# Patient Record
Sex: FEMALE | Race: WHITE | Employment: UNEMPLOYED | URBAN - METROPOLITAN AREA
[De-identification: names, ages, dates, MRNs, and addresses within clinical notes are randomized per-mention and may not be internally consistent; named-entity substitution may affect disease eponyms.]

---

## 2017-03-14 ENCOUNTER — OFFICE VISIT (OUTPATIENT)
Dept: ENDOCRINOLOGY | Age: 57
End: 2017-03-14

## 2017-03-14 VITALS
OXYGEN SATURATION: 98 % | HEART RATE: 76 BPM | BODY MASS INDEX: 29.98 KG/M2 | WEIGHT: 191 LBS | DIASTOLIC BLOOD PRESSURE: 68 MMHG | RESPIRATION RATE: 14 BRPM | SYSTOLIC BLOOD PRESSURE: 105 MMHG | HEIGHT: 67 IN

## 2017-03-14 LAB — HBA1C MFR BLD: 10.6 %

## 2017-03-14 PROCEDURE — 99214 OFFICE O/P EST MOD 30 MIN: CPT | Performed by: INTERNAL MEDICINE

## 2017-03-14 PROCEDURE — 83036 HEMOGLOBIN GLYCOSYLATED A1C: CPT | Performed by: INTERNAL MEDICINE

## 2017-03-14 RX ORDER — ATORVASTATIN CALCIUM 40 MG/1
40 TABLET, FILM COATED ORAL NIGHTLY
COMMUNITY

## 2017-03-14 RX ORDER — ANASTROZOLE 1 MG/1
1 TABLET ORAL DAILY
COMMUNITY
End: 2020-09-28 | Stop reason: ALTCHOICE

## 2017-06-15 ENCOUNTER — OFFICE VISIT (OUTPATIENT)
Dept: ENDOCRINOLOGY | Age: 57
End: 2017-06-15

## 2017-06-15 VITALS
HEART RATE: 85 BPM | RESPIRATION RATE: 16 BRPM | HEIGHT: 67 IN | SYSTOLIC BLOOD PRESSURE: 88 MMHG | DIASTOLIC BLOOD PRESSURE: 67 MMHG | WEIGHT: 187.2 LBS | OXYGEN SATURATION: 95 % | BODY MASS INDEX: 29.38 KG/M2

## 2017-06-15 LAB — HBA1C MFR BLD: 6.7 %

## 2017-06-15 PROCEDURE — 83036 HEMOGLOBIN GLYCOSYLATED A1C: CPT | Performed by: INTERNAL MEDICINE

## 2017-06-15 PROCEDURE — 99214 OFFICE O/P EST MOD 30 MIN: CPT | Performed by: INTERNAL MEDICINE

## 2017-11-13 RX ORDER — DULAGLUTIDE 0.75 MG/.5ML
INJECTION, SOLUTION SUBCUTANEOUS
Qty: 2 ML | Refills: 3 | Status: SHIPPED | OUTPATIENT
Start: 2017-11-13 | End: 2018-03-20 | Stop reason: SDUPTHER

## 2018-01-11 ENCOUNTER — OFFICE VISIT (OUTPATIENT)
Dept: ENDOCRINOLOGY | Age: 58
End: 2018-01-11

## 2018-01-11 VITALS
WEIGHT: 177.8 LBS | DIASTOLIC BLOOD PRESSURE: 68 MMHG | HEIGHT: 67 IN | SYSTOLIC BLOOD PRESSURE: 102 MMHG | RESPIRATION RATE: 14 BRPM | HEART RATE: 78 BPM | OXYGEN SATURATION: 100 % | BODY MASS INDEX: 27.91 KG/M2

## 2018-01-11 PROCEDURE — G8427 DOCREV CUR MEDS BY ELIG CLIN: HCPCS | Performed by: INTERNAL MEDICINE

## 2018-01-11 PROCEDURE — 99214 OFFICE O/P EST MOD 30 MIN: CPT | Performed by: INTERNAL MEDICINE

## 2018-01-11 PROCEDURE — G8419 CALC BMI OUT NRM PARAM NOF/U: HCPCS | Performed by: INTERNAL MEDICINE

## 2018-01-11 PROCEDURE — G8484 FLU IMMUNIZE NO ADMIN: HCPCS | Performed by: INTERNAL MEDICINE

## 2018-01-11 PROCEDURE — 1036F TOBACCO NON-USER: CPT | Performed by: INTERNAL MEDICINE

## 2018-01-11 PROCEDURE — 3014F SCREEN MAMMO DOC REV: CPT | Performed by: INTERNAL MEDICINE

## 2018-01-11 PROCEDURE — 3017F COLORECTAL CA SCREEN DOC REV: CPT | Performed by: INTERNAL MEDICINE

## 2018-01-11 PROCEDURE — 3046F HEMOGLOBIN A1C LEVEL >9.0%: CPT | Performed by: INTERNAL MEDICINE

## 2018-01-11 NOTE — PROGRESS NOTES
Endocrinology   Nick Segovia M.D. Phone: [phone]   FAX: [phone]       Merry Winchester   YOB: 1960    Date of Visit:  1/11/2018    No Known Allergies  Outpatient Prescriptions Marked as Taking for the 1/11/18 encounter (Office Visit) with Ken Crowell MD   Medication Sig Dispense Refill    TRULICITY 2.14 KG/5.0UP SOPN INJECT 0.75 MG( 0.5 ML) UNDER THE SKIN ONCE A WEEK 2 mL 3    insulin glargine (LANTUS SOLOSTAR) 100 UNIT/ML injection pen Inject 35 Units into the skin nightly 15 mL 5    metFORMIN (GLUCOPHAGE) 1000 MG tablet Take 1 tablet by mouth 2 times daily (with meals) 60 tablet 3    Insulin Pen Needle 32G X 4 MM MISC 1 each by Does not apply route 4 times daily (before meals and nightly) 150 each 2    atorvastatin (LIPITOR) 40 MG tablet Take 40 mg by mouth nightly      anastrozole (ARIMIDEX) 1 MG tablet Take 1 mg by mouth daily      levothyroxine (SYNTHROID) 50 MCG tablet Take 50 mcg by mouth Daily      lisinopril (PRINIVIL;ZESTRIL) 5 MG tablet Take 5 mg by mouth daily      metoprolol (TOPROL-XL) 50 MG XL tablet Take 50 mg by mouth 2 times daily            Vitals:    01/11/18 1620   BP: 102/68   Site: Right Arm   Position: Sitting   Cuff Size: Medium Adult   Pulse: 78   Resp: 14   SpO2: 100%   Weight: 177 lb 12.8 oz (80.6 kg)   Height: 5' 7\" (1.702 m)     Body mass index is 27.85 kg/m².      Wt Readings from Last 3 Encounters:   01/11/18 177 lb 12.8 oz (80.6 kg)   06/15/17 187 lb 3.2 oz (84.9 kg)   03/14/17 191 lb (86.6 kg)     BP Readings from Last 3 Encounters:   01/11/18 102/68   06/15/17 88/67   03/14/17 105/68        Past Medical History:   Diagnosis Date    CAD (coronary artery disease) 2009    Cancer ([address] 3/15    Depression     Hyperlipidemia     Hypothyroidism 2012    Type II or unspecified type diabetes mellitus without mention of complication, not stated as uncontrolled 1990    Urinary incontinence      Past Surgical History:   Procedure Laterality Date    APPENDECTOMY      CHOLECYSTECTOMY      COLONOSCOPY      CORONARY ARTERY BYPASS GRAFT  2009     Family History   Problem Relation Age of Onset    Arthritis Mother     Diabetes Father     Heart Disease Paternal Uncle     Early Death Paternal Uncle     Kidney Disease Paternal Uncle     Arthritis Maternal Grandmother     Stroke Maternal Grandfather      History   Smoking Status    Former Smoker    Quit date: 1/1/2009   Smokeless Tobacco    Never Used      History   Alcohol Use No       HPI      Susanne Painter is a 62 y.o. female who is here for a follow-up for management of DM. Patient has a PMH of Type 2 DM, hypertension, hyperlipidemia, hypothyroidism, breast cancer, CAD, memory problems. Diagnosed with Diabetes Mellitus type 2 at the age of 27 yrs. Course has been variable . Microvascular complications:No retinopathy (Last eye exam: 2017), No nephropathy . No  Peripheral neuropathy    Home regimen:  Currently on metformin 1000 mg BID  Lantus insulin 35 units qhs  Trulicity 9.28 mg weekly. Previous medications: Novolog  Blood glucose trend  Not testing sugars. Diet: Eats 3 meals/day. Had Nutrition education:  Exercise: using treadmill for 45-60 mins. Review of Systems   Constitutional: Positive for malaise/fatigue. Negative for fever, chills, weight loss and diaphoresis. Eyes: Negative for blurred vision, double vision and photophobia. Respiratory: Negative for cough and hemoptysis. Cardiovascular: Negative for chest pain, palpitations and orthopnea. Genitourinary: Negative for dysuria, urgency, frequency, hematuria and flank pain. Musculoskeletal: Negative for myalgias, back pain, joint pain, falls and neck pain. Skin: Negative for itching and rash. Neurological: Negative for dizziness, tingling, tremors, sensory change, speech change, focal weakness, seizures, loss of consciousness and headaches.    Endo/Heme/Allergies: Negative for environmental allergies and polydipsia. Does not bruise/bleed easily. Psychiatric/Behavioral: Positive for memory loss. Negative for depression, suicidal ideas, hallucinations and substance abuse. The patient is not nervous/anxious and does not have insomnia. Physical Exam   Constitutional: She is oriented to person, place, and time. She appears well-developed and well-nourished. HENT:   Head: Normocephalic. Mouth/Throat: Oropharynx is clear and moist.   Eyes: EOM are normal. Right eye exhibits no discharge. Neck: No thyromegaly present. Cardiovascular: Normal rate and normal heart sounds. Pulmonary/Chest: Effort normal and breath sounds normal.   Abdominal: Soft. She exhibits no distension. There is no tenderness. Musculoskeletal: She exhibits no tenderness. Neurological: She is alert and oriented to person, place, and time. Skin: Skin is warm. No rash noted. She is not diaphoretic. Psychiatric: Her behavior is normal.     Normal sensation to 10 grams monofilament testing. Normal pulses in both feet. No ulcer. Labs  Reviewed from [facility].    Assessment/Plan       1. Type 2 DM     Riki Scott is a 62 y.o. female has Type 2 DM     Control has improved. A1c 8.5 % ---> 7.7 %---> 6.6 % ---> 10.6 %---> 6.7 %-->  6.3 %      She has memory issues and was having difficulty remembering to take her shots.     -Decrease Lantus to 32 units QHS   Trulicity 9.01 mg weekly. ( mother reminds her)  Continue metformin   Advised to start testing sugars. Advised follow-up with the ophthalmologist once year. Last urine microalbumin/cr ratio was normal in 03/15. Discussed foot care. Pt on ASA. 2. HTN/HLP as per PCP. 3. Hypothyroidism. On levothyroxine. TSH normal in 08/17    4. Breast ca. S/p surgery and radiation. As per oncology.

## 2018-03-21 RX ORDER — DULAGLUTIDE 0.75 MG/.5ML
INJECTION, SOLUTION SUBCUTANEOUS
Qty: 4 PEN | Refills: 3 | Status: SHIPPED | OUTPATIENT
Start: 2018-03-21 | End: 2018-08-02 | Stop reason: SDUPTHER

## 2018-05-17 ENCOUNTER — OFFICE VISIT (OUTPATIENT)
Dept: ENDOCRINOLOGY | Age: 58
End: 2018-05-17

## 2018-05-17 VITALS
HEART RATE: 69 BPM | OXYGEN SATURATION: 100 % | HEIGHT: 67 IN | BODY MASS INDEX: 27.75 KG/M2 | DIASTOLIC BLOOD PRESSURE: 70 MMHG | RESPIRATION RATE: 14 BRPM | SYSTOLIC BLOOD PRESSURE: 110 MMHG | WEIGHT: 176.8 LBS

## 2018-05-17 LAB — HBA1C MFR BLD: 6.3 %

## 2018-05-17 PROCEDURE — G8417 CALC BMI ABV UP PARAM F/U: HCPCS | Performed by: INTERNAL MEDICINE

## 2018-05-17 PROCEDURE — 2022F DILAT RTA XM EVC RTNOPTHY: CPT | Performed by: INTERNAL MEDICINE

## 2018-05-17 PROCEDURE — 3044F HG A1C LEVEL LT 7.0%: CPT | Performed by: INTERNAL MEDICINE

## 2018-05-17 PROCEDURE — 99214 OFFICE O/P EST MOD 30 MIN: CPT | Performed by: INTERNAL MEDICINE

## 2018-05-17 PROCEDURE — 1036F TOBACCO NON-USER: CPT | Performed by: INTERNAL MEDICINE

## 2018-05-17 PROCEDURE — 83036 HEMOGLOBIN GLYCOSYLATED A1C: CPT | Performed by: INTERNAL MEDICINE

## 2018-05-17 PROCEDURE — G8427 DOCREV CUR MEDS BY ELIG CLIN: HCPCS | Performed by: INTERNAL MEDICINE

## 2018-05-17 PROCEDURE — 3017F COLORECTAL CA SCREEN DOC REV: CPT | Performed by: INTERNAL MEDICINE

## 2018-08-03 RX ORDER — DULAGLUTIDE 0.75 MG/.5ML
INJECTION, SOLUTION SUBCUTANEOUS
Qty: 2 ML | Refills: 3 | Status: SHIPPED | OUTPATIENT
Start: 2018-08-03 | End: 2018-08-21 | Stop reason: SDUPTHER

## 2018-08-13 RX ORDER — INSULIN GLARGINE 100 [IU]/ML
30 INJECTION, SOLUTION SUBCUTANEOUS NIGHTLY
Qty: 15 ML | Refills: 2 | Status: SHIPPED | OUTPATIENT
Start: 2018-08-13 | End: 2019-02-13 | Stop reason: SDUPTHER

## 2018-08-13 RX ORDER — INSULIN GLARGINE 100 [IU]/ML
30 INJECTION, SOLUTION SUBCUTANEOUS NIGHTLY
COMMUNITY
End: 2018-08-13 | Stop reason: SDUPTHER

## 2018-11-12 ENCOUNTER — OFFICE VISIT (OUTPATIENT)
Dept: ENDOCRINOLOGY | Age: 58
End: 2018-11-12
Payer: MEDICARE

## 2018-11-12 VITALS
SYSTOLIC BLOOD PRESSURE: 94 MMHG | HEIGHT: 67 IN | HEART RATE: 72 BPM | OXYGEN SATURATION: 100 % | BODY MASS INDEX: 26.97 KG/M2 | WEIGHT: 171.8 LBS | RESPIRATION RATE: 14 BRPM | DIASTOLIC BLOOD PRESSURE: 51 MMHG

## 2018-11-12 DIAGNOSIS — E03.9 ACQUIRED HYPOTHYROIDISM: ICD-10-CM

## 2018-11-12 DIAGNOSIS — E78.00 PURE HYPERCHOLESTEROLEMIA: ICD-10-CM

## 2018-11-12 LAB — HBA1C MFR BLD: 6.1 %

## 2018-11-12 PROCEDURE — G8484 FLU IMMUNIZE NO ADMIN: HCPCS | Performed by: INTERNAL MEDICINE

## 2018-11-12 PROCEDURE — 99214 OFFICE O/P EST MOD 30 MIN: CPT | Performed by: INTERNAL MEDICINE

## 2018-11-12 PROCEDURE — 2022F DILAT RTA XM EVC RTNOPTHY: CPT | Performed by: INTERNAL MEDICINE

## 2018-11-12 PROCEDURE — 1036F TOBACCO NON-USER: CPT | Performed by: INTERNAL MEDICINE

## 2018-11-12 PROCEDURE — G8417 CALC BMI ABV UP PARAM F/U: HCPCS | Performed by: INTERNAL MEDICINE

## 2018-11-12 PROCEDURE — G8427 DOCREV CUR MEDS BY ELIG CLIN: HCPCS | Performed by: INTERNAL MEDICINE

## 2018-11-12 PROCEDURE — 83036 HEMOGLOBIN GLYCOSYLATED A1C: CPT | Performed by: INTERNAL MEDICINE

## 2018-11-12 PROCEDURE — 3017F COLORECTAL CA SCREEN DOC REV: CPT | Performed by: INTERNAL MEDICINE

## 2018-11-12 PROCEDURE — 3044F HG A1C LEVEL LT 7.0%: CPT | Performed by: INTERNAL MEDICINE

## 2018-11-24 DIAGNOSIS — E03.9 ACQUIRED HYPOTHYROIDISM: ICD-10-CM

## 2018-11-24 LAB
CREATININE URINE: 225.1 MG/DL (ref 28–259)
MICROALBUMIN UR-MCNC: 1.3 MG/DL
MICROALBUMIN/CREAT UR-RTO: 5.8 MG/G (ref 0–30)
TSH REFLEX FT4: 2.68 UIU/ML (ref 0.27–4.2)

## 2019-02-13 RX ORDER — INSULIN GLARGINE 100 [IU]/ML
INJECTION, SOLUTION SUBCUTANEOUS
Qty: 15 ML | Refills: 1 | Status: SHIPPED | OUTPATIENT
Start: 2019-02-13 | End: 2019-07-03 | Stop reason: SDUPTHER

## 2019-03-12 ENCOUNTER — OFFICE VISIT (OUTPATIENT)
Dept: ENDOCRINOLOGY | Age: 59
End: 2019-03-12
Payer: MEDICARE

## 2019-03-12 VITALS
SYSTOLIC BLOOD PRESSURE: 130 MMHG | RESPIRATION RATE: 16 BRPM | OXYGEN SATURATION: 97 % | HEIGHT: 67 IN | DIASTOLIC BLOOD PRESSURE: 80 MMHG | WEIGHT: 171.8 LBS | BODY MASS INDEX: 26.97 KG/M2 | HEART RATE: 86 BPM

## 2019-03-12 DIAGNOSIS — E03.9 ACQUIRED HYPOTHYROIDISM: ICD-10-CM

## 2019-03-12 DIAGNOSIS — E78.00 PURE HYPERCHOLESTEROLEMIA: ICD-10-CM

## 2019-03-12 LAB — HBA1C MFR BLD: 6.2 %

## 2019-03-12 PROCEDURE — 99214 OFFICE O/P EST MOD 30 MIN: CPT | Performed by: INTERNAL MEDICINE

## 2019-03-12 PROCEDURE — G8417 CALC BMI ABV UP PARAM F/U: HCPCS | Performed by: INTERNAL MEDICINE

## 2019-03-12 PROCEDURE — 83036 HEMOGLOBIN GLYCOSYLATED A1C: CPT | Performed by: INTERNAL MEDICINE

## 2019-03-12 PROCEDURE — 1036F TOBACCO NON-USER: CPT | Performed by: INTERNAL MEDICINE

## 2019-03-12 PROCEDURE — G8484 FLU IMMUNIZE NO ADMIN: HCPCS | Performed by: INTERNAL MEDICINE

## 2019-03-12 PROCEDURE — G8427 DOCREV CUR MEDS BY ELIG CLIN: HCPCS | Performed by: INTERNAL MEDICINE

## 2019-03-12 PROCEDURE — 2022F DILAT RTA XM EVC RTNOPTHY: CPT | Performed by: INTERNAL MEDICINE

## 2019-03-12 PROCEDURE — 3017F COLORECTAL CA SCREEN DOC REV: CPT | Performed by: INTERNAL MEDICINE

## 2019-03-12 PROCEDURE — 3044F HG A1C LEVEL LT 7.0%: CPT | Performed by: INTERNAL MEDICINE

## 2019-03-12 RX ORDER — LANCETS 28 GAUGE
1 EACH MISCELLANEOUS DAILY
Qty: 100 EACH | Refills: 3 | Status: SHIPPED | OUTPATIENT
Start: 2019-03-12

## 2019-03-12 RX ORDER — BLOOD-GLUCOSE METER
1 KIT MISCELLANEOUS DAILY PRN
Qty: 1 DEVICE | Refills: 0 | Status: SHIPPED | OUTPATIENT
Start: 2019-03-12

## 2019-05-24 RX ORDER — PEN NEEDLE, DIABETIC 32GX 5/32"
NEEDLE, DISPOSABLE MISCELLANEOUS
Qty: 120 EACH | Refills: 5 | Status: SHIPPED | OUTPATIENT
Start: 2019-05-24 | End: 2020-08-17

## 2019-07-03 RX ORDER — INSULIN GLARGINE 100 [IU]/ML
INJECTION, SOLUTION SUBCUTANEOUS
Qty: 15 ML | Refills: 1 | Status: SHIPPED | OUTPATIENT
Start: 2019-07-03 | End: 2019-11-14 | Stop reason: SDUPTHER

## 2019-07-16 ENCOUNTER — OFFICE VISIT (OUTPATIENT)
Dept: ENDOCRINOLOGY | Age: 59
End: 2019-07-16
Payer: MEDICARE

## 2019-07-16 VITALS
BODY MASS INDEX: 26.3 KG/M2 | HEART RATE: 80 BPM | SYSTOLIC BLOOD PRESSURE: 97 MMHG | WEIGHT: 167.6 LBS | OXYGEN SATURATION: 97 % | HEIGHT: 67 IN | DIASTOLIC BLOOD PRESSURE: 67 MMHG

## 2019-07-16 DIAGNOSIS — E78.00 PURE HYPERCHOLESTEROLEMIA: ICD-10-CM

## 2019-07-16 DIAGNOSIS — E03.9 ACQUIRED HYPOTHYROIDISM: ICD-10-CM

## 2019-07-16 LAB — HBA1C MFR BLD: 6.8 %

## 2019-07-16 PROCEDURE — 99214 OFFICE O/P EST MOD 30 MIN: CPT | Performed by: INTERNAL MEDICINE

## 2019-07-16 PROCEDURE — 3044F HG A1C LEVEL LT 7.0%: CPT | Performed by: INTERNAL MEDICINE

## 2019-07-16 PROCEDURE — 3017F COLORECTAL CA SCREEN DOC REV: CPT | Performed by: INTERNAL MEDICINE

## 2019-07-16 PROCEDURE — G8417 CALC BMI ABV UP PARAM F/U: HCPCS | Performed by: INTERNAL MEDICINE

## 2019-07-16 PROCEDURE — 1036F TOBACCO NON-USER: CPT | Performed by: INTERNAL MEDICINE

## 2019-07-16 PROCEDURE — G8427 DOCREV CUR MEDS BY ELIG CLIN: HCPCS | Performed by: INTERNAL MEDICINE

## 2019-07-16 PROCEDURE — 2022F DILAT RTA XM EVC RTNOPTHY: CPT | Performed by: INTERNAL MEDICINE

## 2019-07-16 PROCEDURE — 83036 HEMOGLOBIN GLYCOSYLATED A1C: CPT | Performed by: INTERNAL MEDICINE

## 2019-07-16 NOTE — PROGRESS NOTES
disease) 2009    Cancer [facility]) 3/15    Depression     Hyperlipidemia     Hypothyroidism 2012    Type II or unspecified type diabetes mellitus without mention of complication, not stated as uncontrolled 1990    Urinary incontinence      Past Surgical History:   Procedure Laterality Date    APPENDECTOMY      CHOLECYSTECTOMY      COLONOSCOPY      CORONARY ARTERY BYPASS GRAFT  2009     Family History   Problem Relation Age of Onset    Arthritis Mother     Diabetes Father     Heart Disease Paternal Uncle     Early Death Paternal Uncle     Kidney Disease Paternal Uncle     Arthritis Maternal Grandmother     Stroke Maternal Grandfather      Social History     Tobacco Use   Smoking Status Former Smoker    Last attempt to quit: 1/1/2009    Years since quitting: 10.5   Smokeless Tobacco Never Used      Social History     Substance and Sexual Activity   Alcohol Use No       HPI      Silvia Blake is a 62 y.o. female who is here for a follow-up for management of DM. Patient has a PMH of Type 2 DM, hypertension, hyperlipidemia, hypothyroidism, breast cancer, CAD, memory problems. Diagnosed with Diabetes Mellitus type 2 at the age of 27 yrs. Course has been variable . Microvascular complications:No retinopathy (Last eye exam: 2017), No nephropathy . No  Peripheral neuropathy    Home regimen:  Currently on metformin 1000 mg BID  Basaglar insulin 26 units qhs  Trulicity 7.12 mg weekly. Previous medications: Novolog  Blood glucose trend  BS       Diet: Eats 3 meals/day. Had Nutrition education:. She has hypothyroidism  Takes levothyroxine 50 mcg daily. Takes it in the morning. She has hyperlipidemia. She takes atorvastatin 40 mg daily. Tolerating well. Review of Systems   Constitutional: Positive for malaise/fatigue. Negative for fever, chills, weight loss and diaphoresis. Eyes: Negative for blurred vision, double vision and photophobia.    Respiratory: Negative for cough

## 2019-11-14 RX ORDER — INSULIN GLARGINE 100 [IU]/ML
INJECTION, SOLUTION SUBCUTANEOUS
Qty: 15 ML | Refills: 2 | Status: SHIPPED | OUTPATIENT
Start: 2019-11-14 | End: 2020-05-05

## 2020-01-14 ENCOUNTER — OFFICE VISIT (OUTPATIENT)
Dept: ENDOCRINOLOGY | Age: 60
End: 2020-01-14
Payer: MEDICARE

## 2020-01-14 VITALS
BODY MASS INDEX: 25.11 KG/M2 | WEIGHT: 160 LBS | HEART RATE: 78 BPM | SYSTOLIC BLOOD PRESSURE: 120 MMHG | OXYGEN SATURATION: 98 % | DIASTOLIC BLOOD PRESSURE: 77 MMHG | HEIGHT: 67 IN

## 2020-01-14 LAB — HBA1C MFR BLD: 5.7 %

## 2020-01-14 PROCEDURE — 1036F TOBACCO NON-USER: CPT | Performed by: INTERNAL MEDICINE

## 2020-01-14 PROCEDURE — 83036 HEMOGLOBIN GLYCOSYLATED A1C: CPT | Performed by: INTERNAL MEDICINE

## 2020-01-14 PROCEDURE — 2022F DILAT RTA XM EVC RTNOPTHY: CPT | Performed by: INTERNAL MEDICINE

## 2020-01-14 PROCEDURE — 3044F HG A1C LEVEL LT 7.0%: CPT | Performed by: INTERNAL MEDICINE

## 2020-01-14 PROCEDURE — 99213 OFFICE O/P EST LOW 20 MIN: CPT | Performed by: INTERNAL MEDICINE

## 2020-01-14 PROCEDURE — G8417 CALC BMI ABV UP PARAM F/U: HCPCS | Performed by: INTERNAL MEDICINE

## 2020-01-14 PROCEDURE — G8427 DOCREV CUR MEDS BY ELIG CLIN: HCPCS | Performed by: INTERNAL MEDICINE

## 2020-01-14 PROCEDURE — 3017F COLORECTAL CA SCREEN DOC REV: CPT | Performed by: INTERNAL MEDICINE

## 2020-01-14 PROCEDURE — G8484 FLU IMMUNIZE NO ADMIN: HCPCS | Performed by: INTERNAL MEDICINE

## 2020-01-14 NOTE — PROGRESS NOTES
Genitourinary: Negative for dysuria, urgency, frequency, hematuria and flank pain. Musculoskeletal: Negative for myalgias, back pain, joint pain, falls and neck pain. Skin: Negative for itching and rash. Neurological: Negative for dizziness, tingling, tremors, sensory change, speech change, focal weakness, seizures, loss of consciousness and headaches. Endo/Heme/Allergies: Negative for environmental allergies and polydipsia. Does not bruise/bleed easily. Psychiatric/Behavioral: Positive for memory loss. Negative for depression, suicidal ideas, hallucinations and substance abuse. The patient is not nervous/anxious and does not have insomnia.       l.         Labs  Reviewed from [facility]     Assessment/Plan       1. Type 2 DM     Tanya Amato is a 61 y.o. female has Type 2 DM     Control has improved. A1c 8.5 % ---> 7.7 %---> 6.6 % ---> 10.6 %---> 6.7 %-->  6.3 %  ---> 6.1 % ---> 6.2 % ---> 6.8 % ---> 5.7 %     She has memory issues and was having difficulty remembering to take her shots. Mother helps with the shots now. - Decreased Basaglar to 20 units QHS   continue Trulicity 2.38 mg weekly. ( mother reminds her)  Continue metformin             Advised follow-up with the ophthalmologist once year. Last urine microalbumin/cr ratio was normal in 03/15 ,11/18, 01/20   Discussed foot care. Pt on ASA. 2. HTN BP  normal. Managed by PCP      3. Hypothyroidism. On levothyroxine 50 mcg daily. .   TSH normal in 08/17 and 11/18 ( 2.68)---> 3.92 ( 04/19)---> 5.07    Dose increased to 75 mcg daily by PCP    Managed by pcp    4. Hyperlipidemia. On statins. Labs in 11/18 ---> 01/20  HDL 53---> 42  ----> 74    As per PCP    5. Breast ca. S/p surgery and radiation. As per oncology. 6. Dementia/memory loss.  As per neurology  Was given 5 days of IV steroids in June 2019

## 2020-04-08 RX ORDER — DULAGLUTIDE 0.75 MG/.5ML
INJECTION, SOLUTION SUBCUTANEOUS
Qty: 6 ML | Refills: 2 | Status: SHIPPED | OUTPATIENT
Start: 2020-04-08 | End: 2020-12-16

## 2020-04-08 NOTE — TELEPHONE ENCOUNTER
Medication:   Requested Prescriptions     Pending Prescriptions Disp Refills    Dulaglutide (TRULICITY) 5.77 AF/6.3VX SOPN [Pharmacy Med Name: Rush Fuentes 4.46AP/0.4XR SDP 4X0.5ML] 6 mL 2     Sig: INJECT THE CONTENTS OF 1 SYRINGE EVERY WEEK         Last appt: 1/14/2020   Next appt: 7/21/2020    Last OARRS: No flowsheet data found.

## 2020-05-05 RX ORDER — INSULIN GLARGINE 100 [IU]/ML
INJECTION, SOLUTION SUBCUTANEOUS
Qty: 15 ML | Refills: 2 | Status: SHIPPED | OUTPATIENT
Start: 2020-05-05 | End: 2020-12-21

## 2020-08-17 RX ORDER — PEN NEEDLE, DIABETIC 32GX 5/32"
NEEDLE, DISPOSABLE MISCELLANEOUS
Qty: 200 EACH | Refills: 2 | Status: SHIPPED | OUTPATIENT
Start: 2020-08-17

## 2020-09-28 ENCOUNTER — OFFICE VISIT (OUTPATIENT)
Dept: ENDOCRINOLOGY | Age: 60
End: 2020-09-28
Payer: MEDICARE

## 2020-09-28 VITALS
HEART RATE: 78 BPM | OXYGEN SATURATION: 98 % | BODY MASS INDEX: 25.06 KG/M2 | SYSTOLIC BLOOD PRESSURE: 99 MMHG | HEIGHT: 67 IN | DIASTOLIC BLOOD PRESSURE: 68 MMHG

## 2020-09-28 LAB — HBA1C MFR BLD: 6.9 %

## 2020-09-28 PROCEDURE — G8427 DOCREV CUR MEDS BY ELIG CLIN: HCPCS | Performed by: INTERNAL MEDICINE

## 2020-09-28 PROCEDURE — 3017F COLORECTAL CA SCREEN DOC REV: CPT | Performed by: INTERNAL MEDICINE

## 2020-09-28 PROCEDURE — 1036F TOBACCO NON-USER: CPT | Performed by: INTERNAL MEDICINE

## 2020-09-28 PROCEDURE — 2022F DILAT RTA XM EVC RTNOPTHY: CPT | Performed by: INTERNAL MEDICINE

## 2020-09-28 PROCEDURE — 3044F HG A1C LEVEL LT 7.0%: CPT | Performed by: INTERNAL MEDICINE

## 2020-09-28 PROCEDURE — 99213 OFFICE O/P EST LOW 20 MIN: CPT | Performed by: INTERNAL MEDICINE

## 2020-09-28 PROCEDURE — G8417 CALC BMI ABV UP PARAM F/U: HCPCS | Performed by: INTERNAL MEDICINE

## 2020-09-28 PROCEDURE — 83036 HEMOGLOBIN GLYCOSYLATED A1C: CPT | Performed by: INTERNAL MEDICINE

## 2020-09-28 RX ORDER — DULOXETIN HYDROCHLORIDE 60 MG/1
CAPSULE, DELAYED RELEASE ORAL
COMMUNITY
Start: 2020-07-18

## 2020-09-28 RX ORDER — LISINOPRIL 5 MG/1
5 TABLET ORAL DAILY
COMMUNITY
Start: 2020-04-29

## 2020-09-28 RX ORDER — FESOTERODINE FUMARATE 4 MG/1
TABLET, FILM COATED, EXTENDED RELEASE ORAL
COMMUNITY
Start: 2020-08-03

## 2020-09-28 RX ORDER — LEVOTHYROXINE SODIUM 0.07 MG/1
TABLET ORAL
COMMUNITY
Start: 2020-09-13 | End: 2021-06-01 | Stop reason: SDUPTHER

## 2020-09-28 NOTE — PROGRESS NOTES
Endocrinology   Taisha Stauffer M.D. Phone: [phone]   FAX: [phone]       Yeimi Stinson   YOB: 1960    Date of Visit:  9/28/2020    No Known Allergies  Outpatient Medications Marked as Taking for the 9/28/20 encounter (Office Visit) with Paul Ordoñez MD   Medication Sig Dispense Refill    lisinopril (PRINIVIL;ZESTRIL) 5 MG tablet Take 5 mg by mouth daily      fesoterodine (TOVIAZ) 4 MG TB24 ER tablet TAKE ONE TABLET(4MG) BY MOUTH DAILY AS NEEDED FOR URINARY INCONTINENCE      DULoxetine (CYMBALTA) 60 MG extended release capsule TAKE 1 CAPSULE BY MOUTH DAILY      BD PEN NEEDLE WILLAM U/F 32G X 4 MM MISC USE FOUR TIMES DAILY BEFORE MEALS AND NIGHTLY 200 each 2    BASAGLAR KWIKPEN 100 UNIT/ML injection pen INJECT 30 UNITS UNDER THE SKIN AT NIGHT 15 mL 2    Dulaglutide (TRULICITY) 9.88 AA/2.3QW SOPN INJECT THE CONTENTS OF 1 SYRINGE EVERY WEEK 6 mL 2    metFORMIN (GLUCOPHAGE) 1000 MG tablet TAKE 1 TABLET BY MOUTH TWICE DAILY 180 tablet 3    Blood Glucose Monitoring Suppl (FREESTYLE LITE) BETTY 1 Device by Does not apply route daily as needed (test BS 1-2 times a day) 1 Device 0    FREESTYLE LANCETS MISC 1 each by Does not apply route daily 100 each 3    blood glucose test strips (FREESTYLE LITE) strip 1 each by In Vitro route daily As needed. 100 each 3    atorvastatin (LIPITOR) 40 MG tablet Take 40 mg by mouth nightly      metoprolol (TOPROL-XL) 50 MG XL tablet Take 25 mg by mouth 2 times daily            Vitals:    09/28/20 1110   BP: 99/68   Site: Right Upper Arm   Position: Sitting   Cuff Size: Medium Adult   Pulse: 78   SpO2: 98%   Height: 5' 7\" (1.702 m)     Body mass index is 25.06 kg/m².      Wt Readings from Last 3 Encounters:   01/14/20 160 lb (72.6 kg)   07/16/19 167 lb 9.6 oz (76 kg)   03/12/19 171 lb 12.8 oz (77.9 kg)     BP Readings from Last 3 Encounters:   09/28/20 99/68   01/14/20 120/77   07/16/19 97/67        Past Medical History:   Diagnosis Date    CAD (coronary artery disease) 2009    Cancer [facility]) 3/15    Depression     Hyperlipidemia     Hypothyroidism     Type II or unspecified type diabetes mellitus without mention of complication, not stated as uncontrolled     Urinary incontinence      Past Surgical History:   Procedure Laterality Date    APPENDECTOMY      CHOLECYSTECTOMY      COLONOSCOPY      CORONARY ARTERY BYPASS GRAFT       Family History   Problem Relation Age of Onset    Arthritis Mother     Diabetes Father     Heart Disease Paternal Uncle     Early Death Paternal Uncle     Kidney Disease Paternal Uncle     Arthritis Maternal Grandmother     Stroke Maternal Grandfather      Social History     Tobacco Use   Smoking Status Former Smoker    Last attempt to quit: 2009    Years since quittin.7   Smokeless Tobacco Never Used      Social History     Substance and Sexual Activity   Alcohol Use No       HPI      Luis Fernando Hernandez is a 61 y.o. female who is here for a follow-up for management of DM. Patient has a PMH of Type 2 DM, hypertension, hyperlipidemia, hypothyroidism, breast cancer, CAD, memory problems. Diagnosed with Diabetes Mellitus type 2 at the age of 27 yrs. Course has been variable . Microvascular complications:No retinopathy (Last eye exam: ), No nephropathy . No  Peripheral neuropathy    Home regimen:  Currently on metformin 1000 mg BID  Basaglar insulin 20 units qhs  Trulicity 6.95 mg weekly. Previous medications: Novolog  Blood glucose trend  Not testing regularly. BS       Diet: Eats 3 meals/day. Had Nutrition education:. She has hypothyroidism  Takes levothyroxine 75 mcg daily. Takes it in the morning. She has hyperlipidemia. She takes atorvastatin 40 mg daily. Tolerating well. Review of Systems   Constitutional: Positive for malaise/fatigue. Negative for fever, chills, weight loss and diaphoresis.    Eyes: Negative for blurred vision, double vision and

## 2020-12-16 RX ORDER — DULAGLUTIDE 0.75 MG/.5ML
INJECTION, SOLUTION SUBCUTANEOUS
Qty: 6 ML | Refills: 2 | Status: SHIPPED | OUTPATIENT
Start: 2020-12-16 | End: 2021-01-06 | Stop reason: SDUPTHER

## 2020-12-16 NOTE — TELEPHONE ENCOUNTER
LOV: 9/28/2020Verla Norton    NOV: 4/8/2021-bari     DOSE: 0.75 mg   Frequency: weekly     Pharmacy as Pended:     Per LOV Note: -continue Trulicity 6.36 mg weekly.     Per Last PHONE NOTE:     Lab Results   Component Value Date    LABA1C 6.9 09/28/2020

## 2020-12-21 RX ORDER — INSULIN GLARGINE 100 [IU]/ML
INJECTION, SOLUTION SUBCUTANEOUS
Qty: 15 ML | Refills: 2 | Status: SHIPPED | OUTPATIENT
Start: 2020-12-21 | End: 2021-05-24 | Stop reason: SDUPTHER

## 2020-12-21 NOTE — TELEPHONE ENCOUNTER
Medication:   Requested Prescriptions     Pending Prescriptions Disp Refills    BASAGLAR KWIKPEN 100 UNIT/ML injection pen [Pharmacy Med Name: Twin Diaz 100 U/ML KWIKPEN INJ 3ML] 15 mL 2     Sig: ADMINISTER 20 UNITS UNDER THE SKIN AT NIGHT         Last appt: 09/28/2020  Next appt: 04/08/2021    Last Labs DM:   Lab Results   Component Value Date    LABA1C 6.9 09/28/2020

## 2021-01-06 RX ORDER — DULAGLUTIDE 0.75 MG/.5ML
INJECTION, SOLUTION SUBCUTANEOUS
Qty: 6 PEN | Refills: 2 | Status: SHIPPED | OUTPATIENT
Start: 2021-01-06 | End: 2021-06-01 | Stop reason: SDUPTHER

## 2021-01-06 NOTE — TELEPHONE ENCOUNTER
Medication:   Requested Prescriptions     Pending Prescriptions Disp Refills    Dulaglutide (TRULICITY) 5.04 SE/7.4YC SOPN 6 pen 2     Sig: INJECT THE CONTENTS OF 1 SYRINGE INTO THE SKIN EVERY WEEK         Last appt: 09/28/2020  Next appt: 04/08/2021    Last Labs DM:   Lab Results   Component Value Date    LABA1C 6.9 09/28/2020

## 2021-03-18 ENCOUNTER — IMMUNIZATION (OUTPATIENT)
Dept: PRIMARY CARE CLINIC | Age: 61
End: 2021-03-18
Payer: MEDICARE

## 2021-03-18 PROCEDURE — 91301 COVID-19, MODERNA VACCINE 100MCG/0.5ML DOSE: CPT

## 2021-03-18 PROCEDURE — 0011A COVID-19, MODERNA VACCINE 100MCG/0.5ML DOSE: CPT

## 2021-04-15 ENCOUNTER — IMMUNIZATION (OUTPATIENT)
Dept: PRIMARY CARE CLINIC | Age: 61
End: 2021-04-15
Payer: MEDICARE

## 2021-04-15 PROCEDURE — 91301 COVID-19, MODERNA VACCINE 100MCG/0.5ML DOSE: CPT | Performed by: FAMILY MEDICINE

## 2021-04-15 PROCEDURE — 0012A COVID-19, MODERNA VACCINE 100MCG/0.5ML DOSE: CPT | Performed by: FAMILY MEDICINE

## 2021-05-24 ENCOUNTER — TELEPHONE (OUTPATIENT)
Dept: ENDOCRINOLOGY | Age: 61
End: 2021-05-24

## 2021-05-24 RX ORDER — INSULIN GLARGINE 100 [IU]/ML
INJECTION, SOLUTION SUBCUTANEOUS
Qty: 15 ML | Refills: 2 | Status: SHIPPED | OUTPATIENT
Start: 2021-05-24 | End: 2021-06-01 | Stop reason: DRUGHIGH

## 2021-06-01 ENCOUNTER — OFFICE VISIT (OUTPATIENT)
Dept: ENDOCRINOLOGY | Age: 61
End: 2021-06-01
Payer: MEDICARE

## 2021-06-01 VITALS
SYSTOLIC BLOOD PRESSURE: 110 MMHG | DIASTOLIC BLOOD PRESSURE: 80 MMHG | HEART RATE: 76 BPM | WEIGHT: 140 LBS | OXYGEN SATURATION: 98 % | BODY MASS INDEX: 21.97 KG/M2 | HEIGHT: 67 IN

## 2021-06-01 DIAGNOSIS — E11.9 TYPE 2 DIABETES MELLITUS WITHOUT COMPLICATION, UNSPECIFIED WHETHER LONG TERM INSULIN USE (HCC): ICD-10-CM

## 2021-06-01 DIAGNOSIS — E03.9 HYPOTHYROIDISM, UNSPECIFIED TYPE: Primary | ICD-10-CM

## 2021-06-01 DIAGNOSIS — E11.65 TYPE 2 DIABETES MELLITUS WITH HYPERGLYCEMIA, WITH LONG-TERM CURRENT USE OF INSULIN (HCC): ICD-10-CM

## 2021-06-01 DIAGNOSIS — Z79.4 TYPE 2 DIABETES MELLITUS WITH HYPERGLYCEMIA, WITH LONG-TERM CURRENT USE OF INSULIN (HCC): ICD-10-CM

## 2021-06-01 DIAGNOSIS — E55.9 VITAMIN D DEFICIENCY: ICD-10-CM

## 2021-06-01 DIAGNOSIS — E78.00 PURE HYPERCHOLESTEROLEMIA: ICD-10-CM

## 2021-06-01 LAB — HBA1C MFR BLD: 6.7 %

## 2021-06-01 PROCEDURE — 99214 OFFICE O/P EST MOD 30 MIN: CPT | Performed by: NURSE PRACTITIONER

## 2021-06-01 PROCEDURE — 1036F TOBACCO NON-USER: CPT | Performed by: NURSE PRACTITIONER

## 2021-06-01 PROCEDURE — G8427 DOCREV CUR MEDS BY ELIG CLIN: HCPCS | Performed by: NURSE PRACTITIONER

## 2021-06-01 PROCEDURE — 3044F HG A1C LEVEL LT 7.0%: CPT | Performed by: NURSE PRACTITIONER

## 2021-06-01 PROCEDURE — 83036 HEMOGLOBIN GLYCOSYLATED A1C: CPT | Performed by: NURSE PRACTITIONER

## 2021-06-01 PROCEDURE — 2022F DILAT RTA XM EVC RTNOPTHY: CPT | Performed by: NURSE PRACTITIONER

## 2021-06-01 PROCEDURE — G8420 CALC BMI NORM PARAMETERS: HCPCS | Performed by: NURSE PRACTITIONER

## 2021-06-01 PROCEDURE — 3017F COLORECTAL CA SCREEN DOC REV: CPT | Performed by: NURSE PRACTITIONER

## 2021-06-01 RX ORDER — LATANOPROST 50 UG/ML
SOLUTION/ DROPS OPHTHALMIC
COMMUNITY
Start: 2021-05-22

## 2021-06-01 RX ORDER — DOCUSATE SODIUM 50 MG AND SENNOSIDES 8.6 MG 8.6; 5 MG/1; MG/1
TABLET, FILM COATED ORAL
COMMUNITY
Start: 2021-04-16

## 2021-06-01 RX ORDER — DONEPEZIL HYDROCHLORIDE 10 MG/1
TABLET, FILM COATED ORAL
COMMUNITY
Start: 2021-05-03

## 2021-06-01 RX ORDER — INSULIN GLARGINE 100 [IU]/ML
INJECTION, SOLUTION SUBCUTANEOUS
Qty: 3 PEN | Refills: 2 | Status: SHIPPED | OUTPATIENT
Start: 2021-06-01

## 2021-06-01 RX ORDER — LEVOTHYROXINE SODIUM 0.07 MG/1
TABLET ORAL
Qty: 30 TABLET | Refills: 2 | Status: SHIPPED | OUTPATIENT
Start: 2021-06-01

## 2021-06-01 RX ORDER — DULAGLUTIDE 0.75 MG/.5ML
INJECTION, SOLUTION SUBCUTANEOUS
Qty: 6 PEN | Refills: 2 | Status: SHIPPED | OUTPATIENT
Start: 2021-06-01

## 2021-06-01 NOTE — PATIENT INSTRUCTIONS
To administer Trulicity every other week  May increase Basaglar up to 30 units at night  Follow up on labs    A1c to be done 3 months from today

## 2021-06-01 NOTE — PROGRESS NOTES
Endocrinology  Christy Kang, SERENA, [address] [facility], [address]  Phone [phone]  Fax [phone]    Tete Hernadez is a 61 y.o. female who is presenting for management of Diabetes Mellitus Type 2. PCP Elizabeth Boucher MD    Last A1C:   Lab Results   Component Value Date    LABA1C 6.7 2021    LABA1C 6.9 2020    LABA1C 5.7 2020     Last BP Readings:  BP Readings from Last 3 Encounters:   21 110/80   20 99/68   20 120/77     Last LDL: No results found for: LDLCALC, LDLCHOLESTEROL, LDLDIRECT  Aspirin Use: 81 mg     Tobacco/Alcohol History:  Social History     Tobacco Use   Smoking Status Former Smoker    Packs/day: 0.25    Years: 10.00    Pack years: 2.50    Types: Cigarettes    Quit date: 2009    Years since quittin.4   Smokeless Tobacco Never Used      Social History     Substance and Sexual Activity   Alcohol Use No       PMH includes  Past Medical History:   Diagnosis Date    CAD (coronary artery disease)     Cancer ([facility] [address] 3/15    Depression     Hyperlipidemia     Hypothyroidism     Type II or unspecified type diabetes mellitus without mention of complication, not stated as uncontrolled     Urinary incontinence      Family History   Problem Relation Age of Onset    Arthritis Mother     Diabetes Father     Heart Disease Paternal Uncle     Early Death Paternal Uncle     Kidney Disease Paternal Uncle     Arthritis Maternal Grandmother     Stroke Maternal Grandfather      Diabetes:  Pedro Shipman  was diagnosed with diabetes type 2 in : 27 years   On insulin: since    Patient reports that disease course has been variable and is currently well controlled   Current microvascular complications include   No retinopathy (Last eye exam: ), 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   No Peripheral neuropathy   Current Macrovascular complications there is no history of  CVD, CAD, PVD   Patient reports compliance for about 100 % of the time and adheres to medication, but usually not to diet and exercise instructions.  There have been no recent hospital or ED admissions for hypoglycemia, hyperglycemia or DKA.  Other ED visit include for none      Blood glucose trends:  Blood glucose trend  Not testing regularly. BS       Current Medication regimen:   Basaglar insulin 24 units qhs  Trulicity 1.67 mg weekly.     Previous medications:   Metformin 1000 mg BID  Novolog    Diabetic Health Maintenance   Diet: Eats 3 meals/day. Had Nutrition education:. No, has dementia  Average carbs per day   Current Exercise: No structured exercise       She has hypothyroidism  Takes levothyroxine 75 mcg daily. Takes it in the morning.      Hyperlipidemia:   On atorvastatin 40 mg daily. Current complaints include occasional myalgias but otherwise tolerates well. No results found for: CHOL  No results found for: TRIG  No results found for: HDL  No results found for: LDLCHOLESTEROL, LDLCALC  No results found for: LDLDIRECT  No results found for: CHOLHDLRATIO    Vitamin D deficiency: Currently is on no rx supplementation. Current complaints include fatigue on daily basis. Last vitamin Dlevel is:  No results found for: VD23T, VITD3, VD25, VITD25    Hypertension  On lisinopril 5mg qd  Controlled , denies symptoms of dizziness, light headedness. Occasional dependent edema. Tries to follow a salt restricted diet. No results found for: NA, K, CL, CO2, BUN, CREATININE, GLUCOSE, CALCIUM, PROT, LABALBU, BILITOT, ALKPHOS, AST, ALT, LABGLOM, GFRAA, AGRATIO, GLOB  The ASCVD Risk score (Brie Madison, et al., 2013) failed to calculate for the following reasons:    Cannot find a previous HDL lab    Cannot find a previous total cholesterol lab      Review of Systems   Constitutional: Positive for fatigue.  Negative for activity change, appetite change, diaphoresis, fever and unexpected weight change. HENT: Negative for dental problem. Eyes: Negative for pain and visual disturbance. Respiratory: Negative for shortness of breath. Cardiovascular: Negative for chest pain, palpitations and leg swelling. Gastrointestinal: Negative for abdominal distention, constipation, diarrhea and nausea. Endocrine: Negative for cold intolerance, heat intolerance, polydipsia, polyphagia and polyuria. Genitourinary: Negative. Negative for frequency and urgency. Musculoskeletal: Negative for arthralgias, back pain, joint swelling and myalgias. Skin: Negative. Negative for color change and pallor. Allergic/Immunologic: Negative. Neurological: Negative for dizziness, weakness, numbness and headaches. Psychiatric/Behavioral: Negative for dysphoric mood and sleep disturbance. The patient is not nervous/anxious. Vitals:    06/01/21 1338   BP: 110/80   Pulse: 76   SpO2: 98%   Weight: 140 lb (63.5 kg)   Height: 5' 7\" (1.702 m)     Physical Exam  Constitutional: Oriented to person, place, and time. well-developed. No distress. HENT:   Mouth/Throat: Oropharynx is clear and moist.   Eyes: EOM are normal.   Neck: No thyromegaly present. Cardiovascular: Normal rate and normal heart sounds. Pulmonary/Chest: Respiratory effort is normal. No respiratory distress, no wheezes. Abdominal: Soft. Bowel sounds are normal. There is no tenderness. Musculoskeletal:  exhibits no edema. Neurological:  alert and oriented to person, place, and time. Skin: Skin is warm and dry. not diaphoretic. Psychiatric: behavior is normal. Thought content normal.     Skeletal foot exam: deferred. Assessment  Zayda Richey is a 61 y.o. female with uncontrolled Diabetes Mellitus type 2  and associated with hypertension, hyperlipidemia, hypothyroidism, breast cancer, CAD, memory problems.    Plan  Diabetes Mellitus Type 2  Lab Results   Component Value Date    LABA1C 6.7 06/01/2021     No results found for: HGB    Goal A1c  < 6.5%  Almost at goal and age appropriate  Medication : continue Trulicity 7.53 mg qw  Insulin: continue Basaglar 24 QHS  Declined titration to fbg goal  Avoid hypoglycemia: discussed management if < 100 BG    Diet :   30-50 grams per meal , 3 meals per day, avoid carbs in snack choices  Include moderate proteins and good fats   Ensure adequate hydration and  electrolyte replacement    Exercise :  Recommended exercise is 5-7 days a week for 30-60 mins at least, per day OR a total of 2.5 hours per week , which ever is more feasible. Ambulate as possible     Diabetic Health Maintenance  Follow up with annual eye exams  Follow up with annual podiatry exams if needed  Reviewed sick day management of BG     Other areas of Diabetic Education reviewed:   Carbs: good carbs and bad carbs, importance of carb counting, incorporation of protein with each meal to reduce Glycemic index, importance of portions, Carb/insulin ratio   Fats: Good fats and bad fats, meal planning and supplements.  Discussed how food affects blood sugar readings.  Different diabetic medications   Managing high and low sugar readings   Rotation of sites for subcutaneous medication injection    Mixed Hyperlipidemia  No results found for: LDLCHOLESTEROL, LDLCALC  No results found for: LDLDIRECT  No results found for: TRIG  Follow up on labs    Essential Hypertension  Blood pressure controlled. Continue current regimen  Follow up on labs.      Vitamin D deficiency  No results found for: VITD25  Continue to supplement  Recheck levels    Problem List Items Addressed This Visit     Pure hypercholesterolemia    Relevant Orders    Lipid Panel    Thyroid activity decreased - Primary    Relevant Medications    levothyroxine (SYNTHROID) 75 MCG tablet    Other Relevant Orders    T4, Free    TSH without Reflex    Vitamin D deficiency    Relevant Orders    Vitamin D 25 Hydroxy      Other Visit Diagnoses     Type 2 diabetes mellitus without complication, unspecified whether long term insulin use (HCC)        Relevant Medications    BASAGLAR KWIKPEN 100 UNIT/ML injection pen    Dulaglutide (TRULICITY) 6.94 KM/4.6PF SOPN    Other Relevant Orders    POCT glycosylated hemoglobin (Hb A1C) (Completed)    Hemoglobin A1C    Type 2 diabetes mellitus with hyperglycemia, with long-term current use of insulin (HCC)        Relevant Medications    BASAGLAR KWIKPEN 100 UNIT/ML injection pen    Dulaglutide (TRULICITY) 8.16 CT/9.9MZ SOPN    Other Relevant Orders    Comprehensive Metabolic Panel    T4, Free    TSH without Reflex        Return in about 6 months (around 12/1/2021).